# Patient Record
(demographics unavailable — no encounter records)

---

## 2025-07-12 NOTE — PHYSICAL EXAM
[Well Developed] : well developed [Well Nourished] : well nourished [No Acute Distress] : no acute distress [Normal Conjunctiva] : normal conjunctiva [Normal Venous Pressure] : normal venous pressure [No Carotid Bruit] : no carotid bruit [Normal S1, S2] : normal S1, S2 [No Rub] : no rub [No Gallop] : no gallop [Clear Lung Fields] : clear lung fields [Good Air Entry] : good air entry [No Respiratory Distress] : no respiratory distress  [Soft] : abdomen soft [Non Tender] : non-tender [No Masses/organomegaly] : no masses/organomegaly [Normal Bowel Sounds] : normal bowel sounds [Normal Gait] : normal gait [No Edema] : no edema [No Cyanosis] : no cyanosis [No Clubbing] : no clubbing [No Varicosities] : no varicosities [No Rash] : no rash [No Skin Lesions] : no skin lesions [Moves all extremities] : moves all extremities [No Focal Deficits] : no focal deficits [Normal Speech] : normal speech [Alert and Oriented] : alert and oriented [Normal memory] : normal memory [de-identified] : + mechanical click in the aortic position

## 2025-07-12 NOTE — HISTORY OF PRESENT ILLNESS
[FreeTextEntry1] : 67F w/ PMH of myocarditis s/p mech AVR here for INR eval   Recent admit for acute appendicitis and INR has been fluctuating  Takes 4.5 mg coumadin regularly but INR sometimes goes up to 2.8 or 3   EKG NSR no STT changes   Assessment and Plan 1. Mechanical AVR   2. HTN  3. HLD   -recommend taking 4.5 mg QD x 7 days then check INR at PCP  -if INR at goal, rec alternate 4.5 mg with 3 mg for INR goal 2-3  -check TTE ECHO for [facility] valve function   Bettina Flanagan MD [facility] Interventional Cardiology Attending [facility] Tel: [phone] Mobile: [phone] - Valentin Go: [email or web address]

## 2025-07-27 NOTE — HISTORY OF PRESENT ILLNESS
[FreeTextEntry1] : 67F w/ PMH of myocarditis s/p mech AVR here for INR eval   Recent admit for acute appendicitis and INR has been fluctuating  Takes 4.5 mg coumadin regularly but INR sometimes goes up to 2.8 or 3   EKG NSR no STT changes   Assessment and Plan 1. Mechanical AVR and MV repair  2. HTN  3. HLD   -recommend taking coumadin 4.5 mg QD -INR today 2.01, weekly INR check w/ PCP  -TTE ECHO today w/ normal functioning mAVR, at least MR/MS, MV s/p prior repair, pt will get record from [place] to review.   Bettina Flanagan MD [facility] Interventional Cardiology Attending [facility] Tel: [phone] Mobile: [phone] - Valentin Go: [email or web address]

## 2025-07-27 NOTE — PHYSICAL EXAM
[Well Developed] : well developed [Well Nourished] : well nourished [No Acute Distress] : no acute distress [Normal Conjunctiva] : normal conjunctiva [Normal Venous Pressure] : normal venous pressure [No Carotid Bruit] : no carotid bruit [Normal S1, S2] : normal S1, S2 [No Rub] : no rub [No Gallop] : no gallop [Clear Lung Fields] : clear lung fields [Good Air Entry] : good air entry [No Respiratory Distress] : no respiratory distress  [Soft] : abdomen soft [Non Tender] : non-tender [No Masses/organomegaly] : no masses/organomegaly [Normal Bowel Sounds] : normal bowel sounds [Normal Gait] : normal gait [No Edema] : no edema [No Cyanosis] : no cyanosis [No Clubbing] : no clubbing [No Varicosities] : no varicosities [No Rash] : no rash [No Skin Lesions] : no skin lesions [Moves all extremities] : moves all extremities [No Focal Deficits] : no focal deficits [Normal Speech] : normal speech [Alert and Oriented] : alert and oriented [Normal memory] : normal memory [de-identified] : + mechanical click in the aortic position